# Patient Record
Sex: MALE
[De-identification: names, ages, dates, MRNs, and addresses within clinical notes are randomized per-mention and may not be internally consistent; named-entity substitution may affect disease eponyms.]

---

## 2021-09-08 PROBLEM — Z00.00 ENCOUNTER FOR PREVENTIVE HEALTH EXAMINATION: Status: ACTIVE | Noted: 2021-09-08

## 2021-09-10 ENCOUNTER — APPOINTMENT (OUTPATIENT)
Dept: PULMONOLOGY | Facility: CLINIC | Age: 41
End: 2021-09-10
Payer: COMMERCIAL

## 2021-09-10 VITALS — WEIGHT: 315 LBS | BODY MASS INDEX: 44.1 KG/M2 | HEIGHT: 71 IN

## 2021-09-10 DIAGNOSIS — Z78.9 OTHER SPECIFIED HEALTH STATUS: ICD-10-CM

## 2021-09-10 DIAGNOSIS — G47.33 OBSTRUCTIVE SLEEP APNEA (ADULT) (PEDIATRIC): ICD-10-CM

## 2021-09-10 PROCEDURE — 99214 OFFICE O/P EST MOD 30 MIN: CPT | Mod: 95

## 2021-09-10 NOTE — REASON FOR VISIT
[Home] : at home, [unfilled] , at the time of the visit. [Medical Office: ([facility])___] : at the medical office located in  [Verbal consent obtained from patient] : the patient, [unfilled] [Initial Evaluation] : an initial evaluation [Sleep Apnea] : sleep apnea

## 2021-09-10 NOTE — HISTORY OF PRESENT ILLNESS
[FreeTextEntry1] : Dr. Kimball\par 40 year old man  with history of ISIAH was given a cpap but couldn’t use it due to mask issues is here in the sleep center to address sleep apnea.  Patient is sleepy with [place] sleepiness score of 14.  Patient has very loud snoring which disturbs his wife, also has witnessed apneas.  Patient's bedtime is around midnight wakes up in the morning around 5 AM.  He feels tired when he wakes up.  Patient drinks 2 cups of coffee during the daytime. Patient does not have any headaches or nocturia.  He is sleepy while driving.\par [place] score - 6\par

## 2022-07-19 NOTE — ASSESSMENT
[FreeTextEntry1] : 40-year-old man with significant symptoms of sleep apnea.\par \par We will do a home study and give him a new CPAP after  the study is done.
[id number]